# Patient Record
Sex: MALE | Race: WHITE
[De-identification: names, ages, dates, MRNs, and addresses within clinical notes are randomized per-mention and may not be internally consistent; named-entity substitution may affect disease eponyms.]

---

## 2018-07-20 ENCOUNTER — HOSPITAL ENCOUNTER (INPATIENT)
Dept: HOSPITAL 83 - 5E | Age: 33
LOS: 3 days | Discharge: HOME | DRG: 897 | End: 2018-07-23
Attending: INTERNAL MEDICINE | Admitting: INTERNAL MEDICINE
Payer: COMMERCIAL

## 2018-07-20 VITALS — DIASTOLIC BLOOD PRESSURE: 56 MMHG | SYSTOLIC BLOOD PRESSURE: 116 MMHG

## 2018-07-20 VITALS — SYSTOLIC BLOOD PRESSURE: 128 MMHG | DIASTOLIC BLOOD PRESSURE: 65 MMHG

## 2018-07-20 VITALS — DIASTOLIC BLOOD PRESSURE: 65 MMHG

## 2018-07-20 VITALS — WEIGHT: 181 LBS | BODY MASS INDEX: 25.34 KG/M2 | HEIGHT: 71 IN

## 2018-07-20 DIAGNOSIS — Z88.1: ICD-10-CM

## 2018-07-20 DIAGNOSIS — F11.23: Primary | ICD-10-CM

## 2018-07-20 DIAGNOSIS — R06.82: ICD-10-CM

## 2018-07-20 DIAGNOSIS — F19.10: ICD-10-CM

## 2018-07-20 DIAGNOSIS — E44.0: ICD-10-CM

## 2018-07-20 DIAGNOSIS — Z71.6: ICD-10-CM

## 2018-07-20 DIAGNOSIS — Z81.1: ICD-10-CM

## 2018-07-20 DIAGNOSIS — R94.5: ICD-10-CM

## 2018-07-20 DIAGNOSIS — F17.210: ICD-10-CM

## 2018-07-20 DIAGNOSIS — B19.20: ICD-10-CM

## 2018-07-20 DIAGNOSIS — R79.89: ICD-10-CM

## 2018-07-20 DIAGNOSIS — F14.10: ICD-10-CM

## 2018-07-20 LAB
ALBUMIN SERPL-MCNC: 3.7 GM/DL (ref 3.1–4.5)
ALP SERPL-CCNC: 59 U/L (ref 45–117)
ALT SERPL W P-5'-P-CCNC: 31 U/L (ref 12–78)
AMPHETAMINES UR QL SCN: < 1000
APPEARANCE UR: CLEAR
AST SERPL-CCNC: 16 IU/L (ref 3–35)
BARBITURATES UR QL SCN: < 200
BASOPHILS # BLD AUTO: 0 10*3/UL (ref 0–0.1)
BASOPHILS NFR BLD AUTO: 0.8 % (ref 0–1)
BENZODIAZ UR QL SCN: < 200
BILIRUB UR QL STRIP: NEGATIVE
BUN SERPL-MCNC: 6 MG/DL (ref 7–24)
BZE UR QL SCN: > 300
CANNABINOIDS UR QL SCN: > 50
CHLORIDE SERPL-SCNC: 106 MMOL/L (ref 98–107)
COLOR UR: YELLOW
CREAT SERPL-MCNC: 0.97 MG/DL (ref 0.7–1.3)
EOSINOPHIL # BLD AUTO: 0.2 10*3/UL (ref 0–0.4)
EOSINOPHIL # BLD AUTO: 4.6 % (ref 1–4)
EPI CELLS #/AREA URNS HPF: (no result) /[HPF]
ERYTHROCYTE [DISTWIDTH] IN BLOOD BY AUTOMATED COUNT: 13.2 % (ref 0–14.5)
GLUCOSE UR QL: NEGATIVE
HCT VFR BLD AUTO: 41 % (ref 42–52)
HGB BLD-MCNC: 13.6 G/DL (ref 14–18)
HGB UR QL STRIP: NEGATIVE
INR BLD: 1 (ref 2–3.5)
KETONES UR QL STRIP: NEGATIVE
LEUKOCYTE ESTERASE UR QL STRIP: NEGATIVE
LIPASE SERPL-CCNC: 65 U/L (ref 73–393)
LYMPHOCYTES # BLD AUTO: 1.2 10*3/UL (ref 1.3–4.4)
LYMPHOCYTES NFR BLD AUTO: 26.1 % (ref 27–41)
MCH RBC QN AUTO: 30.2 PG (ref 27–31)
MCHC RBC AUTO-ENTMCNC: 33.2 G/DL (ref 33–37)
MCV RBC AUTO: 90.9 FL (ref 80–94)
METHADONE UR QL SCN: < 300
MONOCYTES # BLD AUTO: 0.3 10*3/UL (ref 0.1–1)
MONOCYTES NFR BLD MANUAL: 6.7 % (ref 3–9)
NEUT #: 2.9 10*3/UL (ref 2.3–7.9)
NEUT %: 61.6 % (ref 47–73)
NITRITE UR QL STRIP: NEGATIVE
NRBC BLD QL AUTO: 0 10*3/UL (ref 0–0)
OPIATES UR QL SCN: > 300
PCP UR QL SCN: <  25
PH UR STRIP: 7 [PH] (ref 5–9)
PHOSPHATE SERPL-MCNC: 2.4 MG/DL (ref 2.5–4.9)
PLATELET # BLD AUTO: 211 10*3/UL (ref 130–400)
PMV BLD AUTO: 9.7 FL (ref 9.6–12.3)
POTASSIUM SERPL-SCNC: 4.2 MMOL/L (ref 3.5–5.1)
PROT SERPL-MCNC: 8 GM/DL (ref 6.4–8.2)
RBC # BLD AUTO: 4.51 10*6/UL (ref 4.5–5.9)
SODIUM SERPL-SCNC: 138 MMOL/L (ref 136–145)
SP GR UR: 1.01 (ref 1–1.03)
UROBILINOGEN UR STRIP-MCNC: 1 E.U./DL (ref 0.2–1)
VITAMIN B12: 383 PG/ML (ref 247–911)
WBC #/AREA URNS HPF: (no result) WBC/HPF (ref 0–5)
WBC NRBC COR # BLD AUTO: 4.8 10*3/UL (ref 4.8–10.8)

## 2018-07-21 VITALS — DIASTOLIC BLOOD PRESSURE: 71 MMHG

## 2018-07-21 VITALS — DIASTOLIC BLOOD PRESSURE: 64 MMHG

## 2018-07-21 VITALS — DIASTOLIC BLOOD PRESSURE: 64 MMHG | SYSTOLIC BLOOD PRESSURE: 120 MMHG

## 2018-07-21 VITALS — DIASTOLIC BLOOD PRESSURE: 60 MMHG

## 2018-07-21 VITALS — DIASTOLIC BLOOD PRESSURE: 77 MMHG

## 2018-07-21 VITALS — DIASTOLIC BLOOD PRESSURE: 65 MMHG

## 2018-07-22 VITALS — SYSTOLIC BLOOD PRESSURE: 108 MMHG | DIASTOLIC BLOOD PRESSURE: 52 MMHG

## 2018-07-22 VITALS — DIASTOLIC BLOOD PRESSURE: 49 MMHG

## 2018-07-22 VITALS — SYSTOLIC BLOOD PRESSURE: 123 MMHG | DIASTOLIC BLOOD PRESSURE: 72 MMHG

## 2018-07-22 VITALS — DIASTOLIC BLOOD PRESSURE: 70 MMHG

## 2018-07-23 VITALS — SYSTOLIC BLOOD PRESSURE: 112 MMHG | DIASTOLIC BLOOD PRESSURE: 58 MMHG

## 2018-07-23 LAB
BASOPHILS # BLD AUTO: 0 10*3/UL (ref 0–0.1)
BASOPHILS NFR BLD AUTO: 0.8 % (ref 0–1)
CREAT SERPL-MCNC: 0.96 MG/DL (ref 0.7–1.3)
EOSINOPHIL # BLD AUTO: 0.4 10*3/UL (ref 0–0.4)
EOSINOPHIL # BLD AUTO: 7.6 % (ref 1–4)
ERYTHROCYTE [DISTWIDTH] IN BLOOD BY AUTOMATED COUNT: 12.9 % (ref 0–14.5)
HCT VFR BLD AUTO: 44.3 % (ref 42–52)
HGB BLD-MCNC: 14.6 G/DL (ref 14–18)
LYMPHOCYTES # BLD AUTO: 1.4 10*3/UL (ref 1.3–4.4)
LYMPHOCYTES NFR BLD AUTO: 26.4 % (ref 27–41)
MCH RBC QN AUTO: 29.9 PG (ref 27–31)
MCHC RBC AUTO-ENTMCNC: 33 G/DL (ref 33–37)
MCV RBC AUTO: 90.8 FL (ref 80–94)
MONOCYTES # BLD AUTO: 0.5 10*3/UL (ref 0.1–1)
MONOCYTES NFR BLD MANUAL: 9.3 % (ref 3–9)
NEUT #: 2.9 10*3/UL (ref 2.3–7.9)
NEUT %: 55.7 % (ref 47–73)
NRBC BLD QL AUTO: 0 10*3/UL (ref 0–0)
PLATELET # BLD AUTO: 176 10*3/UL (ref 130–400)
PMV BLD AUTO: 10 FL (ref 9.6–12.3)
RBC # BLD AUTO: 4.88 10*6/UL (ref 4.5–5.9)
WBC NRBC COR # BLD AUTO: 5.3 10*3/UL (ref 4.8–10.8)

## 2019-01-25 ENCOUNTER — HOSPITAL ENCOUNTER (INPATIENT)
Dept: HOSPITAL 83 - 4E | Age: 34
LOS: 3 days | Discharge: LEFT BEFORE DISCHARGE | DRG: 894 | End: 2019-01-28
Attending: EMERGENCY MEDICINE | Admitting: EMERGENCY MEDICINE
Payer: COMMERCIAL

## 2019-01-25 VITALS — DIASTOLIC BLOOD PRESSURE: 70 MMHG

## 2019-01-25 VITALS — SYSTOLIC BLOOD PRESSURE: 104 MMHG | DIASTOLIC BLOOD PRESSURE: 50 MMHG

## 2019-01-25 VITALS — HEIGHT: 70.98 IN | BODY MASS INDEX: 23.27 KG/M2 | WEIGHT: 166.19 LBS

## 2019-01-25 DIAGNOSIS — Z71.6: ICD-10-CM

## 2019-01-25 DIAGNOSIS — F41.9: ICD-10-CM

## 2019-01-25 DIAGNOSIS — Z88.1: ICD-10-CM

## 2019-01-25 DIAGNOSIS — F14.10: ICD-10-CM

## 2019-01-25 DIAGNOSIS — F11.23: Primary | ICD-10-CM

## 2019-01-25 DIAGNOSIS — B19.20: ICD-10-CM

## 2019-01-25 DIAGNOSIS — Z81.1: ICD-10-CM

## 2019-01-25 DIAGNOSIS — F17.210: ICD-10-CM

## 2019-01-25 DIAGNOSIS — Z53.21: ICD-10-CM

## 2019-01-25 LAB
ALBUMIN SERPL-MCNC: 3.7 GM/DL (ref 3.1–4.5)
ALP SERPL-CCNC: 54 U/L (ref 45–117)
ALT SERPL W P-5'-P-CCNC: 17 U/L (ref 12–78)
AMPHETAMINES UR QL SCN: < 1000
AST SERPL-CCNC: 14 IU/L (ref 3–35)
BARBITURATES UR QL SCN: < 200
BENZODIAZ UR QL SCN: < 200
BUN SERPL-MCNC: 9 MG/DL (ref 7–24)
BZE UR QL SCN: > 300
CANNABINOIDS UR QL SCN: < 50
CHLORIDE SERPL-SCNC: 107 MMOL/L (ref 98–107)
CREAT SERPL-MCNC: 0.79 MG/DL (ref 0.7–1.3)
ERYTHROCYTE [DISTWIDTH] IN BLOOD BY AUTOMATED COUNT: 13.2 % (ref 0–14.5)
ETHANOL SERPL-MCNC: < 3 MG/DL (ref ?–3)
HCT VFR BLD AUTO: 42.7 % (ref 42–52)
HGB BLD-MCNC: 14.2 G/DL (ref 14–18)
INR BLD: 1 (ref 2–3.5)
MCH RBC QN AUTO: 29.3 PG (ref 27–31)
MCHC RBC AUTO-ENTMCNC: 33.3 G/DL (ref 33–37)
MCV RBC AUTO: 88.2 FL (ref 80–94)
METHADONE UR QL SCN: < 300
NRBC BLD QL AUTO: 0 10*3/UL (ref 0–0)
OPIATES UR QL SCN: > 300
PCP UR QL SCN: <  25
PLATELET # BLD AUTO: 154 10*3/UL (ref 130–400)
PLATELET SUFFICIENCY: NORMAL
PMV BLD AUTO: 9.8 FL (ref 9.6–12.3)
POTASSIUM SERPL-SCNC: 3.9 MMOL/L (ref 3.5–5.1)
PROT SERPL-MCNC: 7.8 GM/DL (ref 6.4–8.2)
RBC # BLD AUTO: 4.84 10*6/UL (ref 4.5–5.9)
RBC MORPH BLD: NORMAL
SODIUM SERPL-SCNC: 139 MMOL/L (ref 136–145)
TOTAL CELLS COUNTED: 100 #CELLS
WBC NRBC COR # BLD AUTO: 4.7 10*3/UL (ref 4.8–10.8)

## 2019-01-25 NOTE — EKG
[place], [place]
 
                               ELECTROCARDIOGRAM REPORT
 
        NAME: HINA ROME              ACCT #: [id number]  
        UNIT #: [id number]                        ROOM: 424       
        DOCTOR: PJ DRAFT REPORT          BIRTHDATE: [birth year]
 
 
 

 
 
                           [facility]
                                       
Test Date:    2019               Test Time:    17:45:02
Pat Name:     HINA ROME        Department:   
Patient ID:   [id number]             Room:         424 1
Gender:       M                        Technician:   18
:          1985               Requested By: COLUMBA OLEA
Order Number: [id number]      Reading MD:   Aston Trevino MD
                                 Measurements
Intervals                              Axis          
Rate:         61                       P:            30
MS:           152                      QRS:          26
QRSD:         78                       T:            6
QT:           413                                    
QTc:          416                                    
                           Interpretive Statements
Sinus rhythm
 
 
Electronically Signed On 2019 21:01:54 PST by Aston Trevino MD
 
CM:EKGRPT:ELECTROCARDIOGRAM REPORT
 
D: 19 1745
T: 19
    
COLUMBA PANG DRAFT REPORT         
COLUMBA OLEA DO

## 2019-01-25 NOTE — NUR
PATIENT MEETS [facility]. CINA=17. PATIENT IS GOING TO FOLLOW UP WITH
THE [facility]. THE FACILITY WILL PROVIDE TRANSPORTATION
UPON DISCHARGE. ALAINA BACH.

## 2019-01-25 NOTE — NUR
MSADMTime: N
A 33  year old MALE admitted to 
under services of CONNIE SAAB DO.
Pt. arrived via ambulatory from
[place].  Chief complaint: WITHDRAWAL.
 
JAREK BRAXTON

## 2019-01-26 VITALS — DIASTOLIC BLOOD PRESSURE: 79 MMHG | SYSTOLIC BLOOD PRESSURE: 139 MMHG

## 2019-01-26 VITALS — SYSTOLIC BLOOD PRESSURE: 101 MMHG | DIASTOLIC BLOOD PRESSURE: 53 MMHG

## 2019-01-26 VITALS — DIASTOLIC BLOOD PRESSURE: 67 MMHG

## 2019-01-26 VITALS — DIASTOLIC BLOOD PRESSURE: 63 MMHG

## 2019-01-26 NOTE — NUR
PATIENT IS SLEEPING AND AROUSES EASILY FOR ASSESSMENT. NO C/O OR S/S OF
DISTRESS NOTED AT THIS TIME. BED IS LOW, LOCKED, AND CALL LIGHT IS WITHIN
REACH. SEE SHIFT ASSESSMENT.

## 2019-01-26 NOTE — NUR
ROBAXIN GIVEN FOR C/O MUSCLE ACHES, REQIP GIVEN FOR C/O RESTLESS LEGS, ATIVAN
GIVEN FOR C/O ANXIETY. WILL MONITOR.

## 2019-01-27 VITALS — DIASTOLIC BLOOD PRESSURE: 62 MMHG

## 2019-01-27 VITALS — SYSTOLIC BLOOD PRESSURE: 102 MMHG | DIASTOLIC BLOOD PRESSURE: 63 MMHG

## 2019-01-27 VITALS — SYSTOLIC BLOOD PRESSURE: 112 MMHG | DIASTOLIC BLOOD PRESSURE: 61 MMHG

## 2019-01-27 VITALS — SYSTOLIC BLOOD PRESSURE: 101 MMHG | DIASTOLIC BLOOD PRESSURE: 53 MMHG

## 2019-01-27 VITALS — SYSTOLIC BLOOD PRESSURE: 98 MMHG | DIASTOLIC BLOOD PRESSURE: 54 MMHG

## 2019-01-27 NOTE — NUR
MEDICATED WITH PRN ATIVAN FOR ANXIOUSNESS, BENTYL FOR ABD CRAMPS, REQUIP FOR
RESTLESS LEGS, AND ROBAXIN FOR MUSCLE ACHES. WILL MONITOR

## 2019-01-28 VITALS — SYSTOLIC BLOOD PRESSURE: 106 MMHG | DIASTOLIC BLOOD PRESSURE: 64 MMHG

## 2019-01-28 VITALS — DIASTOLIC BLOOD PRESSURE: 64 MMHG

## 2019-01-28 NOTE — NUR
PATIENT RESTING IN BED WITH EYES CLOSED. RESPS EASY AND REGULAR. BED IN LOWEST
POSITION, CALL LIGHT IN REACH

## 2019-01-28 NOTE — NUR
Patient signed out AMA. Patient encouraged to stay and advised of
possible consequences of premature discharge.
Physician VINICIUS and supervisor SERAFIN GOMEZ notified.
Patient instructed what to do regarding care post-departure
from the hospital; emergency phone numbers provided.
Patent was accompanied by GIRLFRIEND.
 
NATASHA MARCELO

## 2019-01-28 NOTE — NUR
PATIENT WAS SCHEDULED TO GO TO [facility] FOR HIS AFTERCARE PLAN. PATIENT
STATES TO [facility] THAT HE HAS NO PLANS ON GOING TO THE [facility]. [facility]
STAFF OFFERED OTHER REFERRAL OPTIONS AND PATIENT REFUSED. [facility] STAFF ASKED
PATIENT IF HE HAS TRANSPORTATION AND HE STATED THAT HE DOES HAVE A RIDE.
ALAINA BACH.

## 2020-02-17 ENCOUNTER — HOSPITAL ENCOUNTER (INPATIENT)
Dept: HOSPITAL 83 - 5E | Age: 35
LOS: 4 days | Discharge: HOME | DRG: 773 | End: 2020-02-21
Attending: INTERNAL MEDICINE | Admitting: INTERNAL MEDICINE
Payer: COMMERCIAL

## 2020-02-17 VITALS — HEIGHT: 71 IN | BODY MASS INDEX: 24.86 KG/M2 | WEIGHT: 177.56 LBS

## 2020-02-17 VITALS — DIASTOLIC BLOOD PRESSURE: 65 MMHG | SYSTOLIC BLOOD PRESSURE: 121 MMHG

## 2020-02-17 DIAGNOSIS — F17.210: ICD-10-CM

## 2020-02-17 DIAGNOSIS — R00.1: ICD-10-CM

## 2020-02-17 DIAGNOSIS — F12.10: ICD-10-CM

## 2020-02-17 DIAGNOSIS — Z88.1: ICD-10-CM

## 2020-02-17 DIAGNOSIS — E87.8: ICD-10-CM

## 2020-02-17 DIAGNOSIS — F14.10: ICD-10-CM

## 2020-02-17 DIAGNOSIS — Z81.1: ICD-10-CM

## 2020-02-17 DIAGNOSIS — Z88.2: ICD-10-CM

## 2020-02-17 DIAGNOSIS — F11.23: Primary | ICD-10-CM

## 2020-02-17 LAB
ALBUMIN SERPL-MCNC: 4 GM/DL (ref 3.1–4.5)
ALP SERPL-CCNC: 62 U/L (ref 45–117)
ALT SERPL W P-5'-P-CCNC: 20 U/L (ref 12–78)
AMPHETAMINES UR QL SCN: < 1000
APPEARANCE UR: CLEAR
AST SERPL-CCNC: 15 IU/L (ref 3–35)
BACTERIA #/AREA URNS HPF: (no result) /[HPF]
BARBITURATES UR QL SCN: < 200
BASOPHILS # BLD AUTO: 0 10*3/UL (ref 0–0.1)
BASOPHILS NFR BLD AUTO: 0.6 % (ref 0–1)
BENZODIAZ UR QL SCN: < 200
BILIRUB UR QL STRIP: NEGATIVE
BUN SERPL-MCNC: 9 MG/DL (ref 7–24)
BZE UR QL SCN: > 300
CANNABINOIDS UR QL SCN: > 50
CHLORIDE SERPL-SCNC: 109 MMOL/L (ref 98–107)
COLOR UR: (no result)
CREAT SERPL-MCNC: 1.05 MG/DL (ref 0.7–1.3)
EOSINOPHIL # BLD AUTO: 0.2 10*3/UL (ref 0–0.4)
EOSINOPHIL # BLD AUTO: 4.4 % (ref 1–4)
EPI CELLS #/AREA URNS HPF: (no result) /[HPF]
ERYTHROCYTE [DISTWIDTH] IN BLOOD BY AUTOMATED COUNT: 12.9 % (ref 0–14.5)
ETHANOL SERPL-MCNC: < 3 MG/DL (ref ?–3)
GLUCOSE UR QL: NEGATIVE
HCT VFR BLD AUTO: 43.3 % (ref 42–52)
HGB BLD-MCNC: 14.2 G/DL (ref 14–18)
HGB UR QL STRIP: (no result)
INR BLD: 1 (ref 2–3.5)
KETONES UR QL STRIP: NEGATIVE
LEUKOCYTE ESTERASE UR QL STRIP: NEGATIVE
LYMPHOCYTES # BLD AUTO: 2.3 10*3/UL (ref 1.3–4.4)
LYMPHOCYTES NFR BLD AUTO: 46.9 % (ref 27–41)
MCH RBC QN AUTO: 29.6 PG (ref 27–31)
MCHC RBC AUTO-ENTMCNC: 32.8 G/DL (ref 33–37)
MCV RBC AUTO: 90.2 FL (ref 80–94)
METHADONE UR QL SCN: < 300
MONOCYTES # BLD AUTO: 0.2 10*3/UL (ref 0.1–1)
MONOCYTES NFR BLD MANUAL: 5 % (ref 3–9)
NEUT #: 2.1 10*3/UL (ref 2.3–7.9)
NEUT %: 42.9 % (ref 47–73)
NITRITE UR QL STRIP: NEGATIVE
NRBC BLD QL AUTO: 0 % (ref 0–0)
OPIATES UR QL SCN: > 300
PCP UR QL SCN: <  25
PH UR STRIP: 7.5 [PH] (ref 5–9)
PLATELET # BLD AUTO: 210 10*3/UL (ref 130–400)
PMV BLD AUTO: 9.8 FL (ref 9.6–12.3)
POTASSIUM SERPL-SCNC: 3.8 MMOL/L (ref 3.5–5.1)
PROT SERPL-MCNC: 7.7 GM/DL (ref 6.4–8.2)
RBC # BLD AUTO: 4.8 10*6/UL (ref 4.5–5.9)
RBC #/AREA URNS HPF: (no result) RBC/HPF (ref 0–2)
SODIUM SERPL-SCNC: 140 MMOL/L (ref 136–145)
SP GR UR: 1.01 (ref 1–1.03)
UROBILINOGEN UR STRIP-MCNC: 0.2 E.U./DL (ref 0.2–1)
WBC #/AREA URNS HPF: (no result) WBC/HPF (ref 0–5)
WBC NRBC COR # BLD AUTO: 4.8 10*3/UL (ref 4.8–10.8)

## 2020-02-17 NOTE — NUR
PATIENT MEETS [facility]. CINA=18. PATIENT WANTS TO FOLLOW UP WITH
[facility] FOR HIS AFTERCARE PLAN. ALAINA BACH. INTAKE
COORDINATOR

## 2020-02-17 NOTE — NUR
34 year old MALE
admitted to room # 518 for stabilization. Reports an addiction to
HEROIN, SUBOXONE AND COCAINE
last used 24 hours prior to admission. Compliant with admission procedure.
Patient denies any anxiety, but is unable to sit still, taps toes to floor
continuously, looks about room, unable to focus eyes on nurse during interview.
See assessment forms for additional information about patient status.

## 2020-02-18 VITALS — SYSTOLIC BLOOD PRESSURE: 114 MMHG | DIASTOLIC BLOOD PRESSURE: 60 MMHG

## 2020-02-18 VITALS — DIASTOLIC BLOOD PRESSURE: 61 MMHG | SYSTOLIC BLOOD PRESSURE: 113 MMHG

## 2020-02-18 VITALS — DIASTOLIC BLOOD PRESSURE: 64 MMHG

## 2020-02-18 NOTE — NUR
[facility] STAFF IN TO SEE PATIENT. PATIENT IS STILL WANTING TO GO TO [facility]
FOR HIS AFTERCARE PLAN. [facility] ST[facility]FF SENT ASSESSMENT TO FACILITY. [facility]
WILL PROVIDE TRANSPORTATION ONCE PATIENT IS DISCHARGED. ALAINA BACH.

## 2020-02-19 VITALS — DIASTOLIC BLOOD PRESSURE: 54 MMHG

## 2020-02-19 VITALS — SYSTOLIC BLOOD PRESSURE: 110 MMHG | DIASTOLIC BLOOD PRESSURE: 52 MMHG

## 2020-02-19 VITALS — DIASTOLIC BLOOD PRESSURE: 63 MMHG

## 2020-02-19 VITALS — DIASTOLIC BLOOD PRESSURE: 70 MMHG | SYSTOLIC BLOOD PRESSURE: 119 MMHG

## 2020-02-19 NOTE — NUR
[facility] STAFF IN TO SEE PATIENT. PATIENT IS ACCEPTED TO [facility]. THEY WILL
PROVIDE TRANSPORTATION ONCE PATIENT IS DISCHARGED FROM [facility]. PATIENT
AGREES AND UNDERSTANDS HIS AFTERCARE PLAN. ALAINA BACH. INTAKE
COORDINATOR

## 2020-02-20 VITALS — DIASTOLIC BLOOD PRESSURE: 65 MMHG | SYSTOLIC BLOOD PRESSURE: 104 MMHG

## 2020-02-20 VITALS — DIASTOLIC BLOOD PRESSURE: 78 MMHG

## 2020-02-20 VITALS — DIASTOLIC BLOOD PRESSURE: 66 MMHG

## 2020-02-20 VITALS — DIASTOLIC BLOOD PRESSURE: 64 MMHG

## 2020-02-20 NOTE — NUR
Patient displaying withdrawal symptoms, including: anxiousness AND
restlessness. PRN medications provided, SEE EMAR. Will continue to monitor
medication effectiveness.

## 2020-02-21 VITALS — SYSTOLIC BLOOD PRESSURE: 111 MMHG | DIASTOLIC BLOOD PRESSURE: 66 MMHG

## 2020-02-21 VITALS — DIASTOLIC BLOOD PRESSURE: 68 MMHG

## 2020-02-21 NOTE — NUR
Discharge instructions reviewed with patient/family. Patient receptive and
verbalizes understanding. Follow-up care arranged. Written instructions given
to patient/family.
HISSOM,PITER
The Discharge Plan/Instructions have been completed.

## 2020-05-22 ENCOUNTER — HOSPITAL ENCOUNTER (INPATIENT)
Dept: HOSPITAL 83 - 4E | Age: 35
LOS: 4 days | Discharge: HOME | DRG: 773 | End: 2020-05-26
Attending: INTERNAL MEDICINE | Admitting: INTERNAL MEDICINE
Payer: COMMERCIAL

## 2020-05-22 VITALS — BODY MASS INDEX: 36.14 KG/M2 | WEIGHT: 184.06 LBS | HEIGHT: 60 IN

## 2020-05-22 VITALS — SYSTOLIC BLOOD PRESSURE: 114 MMHG | DIASTOLIC BLOOD PRESSURE: 60 MMHG

## 2020-05-22 VITALS — DIASTOLIC BLOOD PRESSURE: 55 MMHG

## 2020-05-22 VITALS — SYSTOLIC BLOOD PRESSURE: 135 MMHG | DIASTOLIC BLOOD PRESSURE: 84 MMHG

## 2020-05-22 DIAGNOSIS — Z71.6: ICD-10-CM

## 2020-05-22 DIAGNOSIS — R73.9: ICD-10-CM

## 2020-05-22 DIAGNOSIS — Z79.899: ICD-10-CM

## 2020-05-22 DIAGNOSIS — Z88.1: ICD-10-CM

## 2020-05-22 DIAGNOSIS — R00.1: ICD-10-CM

## 2020-05-22 DIAGNOSIS — F17.210: ICD-10-CM

## 2020-05-22 DIAGNOSIS — F12.10: ICD-10-CM

## 2020-05-22 DIAGNOSIS — Z81.1: ICD-10-CM

## 2020-05-22 DIAGNOSIS — F11.23: Primary | ICD-10-CM

## 2020-05-22 DIAGNOSIS — F14.10: ICD-10-CM

## 2020-05-22 DIAGNOSIS — Z88.8: ICD-10-CM

## 2020-05-22 DIAGNOSIS — M25.471: ICD-10-CM

## 2020-05-22 DIAGNOSIS — D64.9: ICD-10-CM

## 2020-05-22 DIAGNOSIS — B19.20: ICD-10-CM

## 2020-05-22 DIAGNOSIS — M79.89: ICD-10-CM

## 2020-05-22 DIAGNOSIS — E87.8: ICD-10-CM

## 2020-05-22 DIAGNOSIS — F41.9: ICD-10-CM

## 2020-05-22 LAB
ALBUMIN SERPL-MCNC: 3.4 GM/DL (ref 3.1–4.5)
ALP SERPL-CCNC: 72 U/L (ref 45–117)
ALT SERPL W P-5'-P-CCNC: 69 U/L (ref 12–78)
AMPHETAMINES UR QL SCN: < 1000
APPEARANCE UR: CLEAR
AST SERPL-CCNC: 28 IU/L (ref 3–35)
BACTERIA #/AREA URNS HPF: (no result) /[HPF]
BARBITURATES UR QL SCN: < 200
BASOPHILS # BLD AUTO: 0 10*3/UL (ref 0–0.1)
BASOPHILS NFR BLD AUTO: 0.2 % (ref 0–1)
BENZODIAZ UR QL SCN: < 200
BILIRUB UR QL STRIP: (no result)
BUN SERPL-MCNC: 8 MG/DL (ref 7–24)
BZE UR QL SCN: > 300
CANNABINOIDS UR QL SCN: > 50
CHLORIDE SERPL-SCNC: 105 MMOL/L (ref 98–107)
COLOR UR: YELLOW
CREAT SERPL-MCNC: 0.73 MG/DL (ref 0.7–1.3)
EOSINOPHIL # BLD AUTO: 0 10*3/UL (ref 0–0.4)
EOSINOPHIL # BLD AUTO: 0.2 % (ref 1–4)
EPI CELLS #/AREA URNS HPF: (no result) /[HPF]
ERYTHROCYTE [DISTWIDTH] IN BLOOD BY AUTOMATED COUNT: 13.3 % (ref 0–14.5)
ETHANOL SERPL-MCNC: < 3 MG/DL (ref ?–3)
GLUCOSE UR QL: NEGATIVE
HCT VFR BLD AUTO: 35.9 % (ref 42–52)
HGB UR QL STRIP: NEGATIVE
INR BLD: 1 (ref 2–3.5)
KETONES UR QL STRIP: (no result)
LEUKOCYTE ESTERASE UR QL STRIP: NEGATIVE
LYMPHOCYTES # BLD AUTO: 0.7 10*3/UL (ref 1.3–4.4)
LYMPHOCYTES NFR BLD AUTO: 9.2 % (ref 27–41)
MCH RBC QN AUTO: 29.6 PG (ref 27–31)
MCHC RBC AUTO-ENTMCNC: 33.1 G/DL (ref 33–37)
MCV RBC AUTO: 89.3 FL (ref 80–94)
METHADONE UR QL SCN: > 300
MONOCYTES # BLD AUTO: 0.9 10*3/UL (ref 0.1–1)
MONOCYTES NFR BLD MANUAL: 11.3 % (ref 3–9)
MUCOUS THREADS URNS QL MICRO: (no result)
NEUT #: 6.3 10*3/UL (ref 2.3–7.9)
NEUT %: 78.6 % (ref 47–73)
NITRITE UR QL STRIP: NEGATIVE
NRBC BLD QL AUTO: 0 % (ref 0–0)
OPIATES UR QL SCN: > 300
PCP UR QL SCN: <  25
PH UR STRIP: 6 [PH] (ref 5–9)
PLATELET # BLD AUTO: 197 10*3/UL (ref 130–400)
PMV BLD AUTO: 9.8 FL (ref 9.6–12.3)
POTASSIUM SERPL-SCNC: 3.5 MMOL/L (ref 3.5–5.1)
PROT SERPL-MCNC: 7.3 GM/DL (ref 6.4–8.2)
RBC # BLD AUTO: 4.02 10*6/UL (ref 4.5–5.9)
SODIUM SERPL-SCNC: 137 MMOL/L (ref 136–145)
SP GR UR: 1.02 (ref 1–1.03)
UROBILINOGEN UR STRIP-MCNC: 2 E.U./DL (ref 0.2–1)
WBC #/AREA URNS HPF: (no result) WBC/HPF (ref 0–5)
WBC NRBC COR # BLD AUTO: 8 10*3/UL (ref 4.8–10.8)

## 2020-05-22 NOTE — NUR
CT CALLS AT THIS TIME AND ASKS IF PATIENT IS ABLE TO DRINK ORAL CONTRAST OR IF
PHYSICIAN WOULD LIKE TO RESCHEDULE CT. DR WASHINGTON NOTIFIED AND SAYS THAT IT
IS OKAY TO RESCHEDULE CT FOR TOMORROW. CT NOTIFIED OF THIS. CURRENT CT ORDER
CANCELED AND NEW ORDER PLACED.

## 2020-05-22 NOTE — NUR
PATIENT MEETS [facility]. CINA=21. JEREMY IS WANTING TO FOLLOW UP
WITH [facility] FOR HIS ATERCARE PLAN. PATIENT REPORT THAT HE HAS
TRANSPORTATION. IF HE NEEDS HIS INSURANCE TO  UP, THE PHONE NUMBER IS
[phone]. [employer]. ALAINA BACH.

## 2020-05-22 NOTE — NUR
BENTYL, ROBAXIN, AND ZOFRAN NOT EFFECTIVE PER PT AT THIS TIME. PHYSICIANS
NOTIFIED AND NEW ORDERS RECEIVED.

## 2020-05-22 NOTE — NUR
DR WASHINGTON STATES TO NOT GIVE BENADRYL AND PHENERGAN TOGETHER, PHYSICIAN
WOULD LIKE PT TO HAVE PHENERGAN.

## 2020-05-22 NOTE — NUR
PT GIVEN PHENERGAN VIA IV PUSH AT THIS TIME TO IV SITE IN LEFT FOREARM. PT
TOLERATED WELL. WILL MONITOR FOR EFFECTIVENESS. PT SITTING UP IN BED, EMESIS
BASIN AT BEDSIDE. SAFETY MEASURES IN PLACE. CALL LIGHT IN REACH.

## 2020-05-22 NOTE — NUR
PT GIVEN PO ZOFRAN, ROBAXIN, AND BENTYL AT THIS TIME FOR C/O NAUSEA, MUSCLE
ACHES, AND STOMACH CRAMPS. PT STATES THAT THEY HAVE HAD MULTIPLE EPISODES OF
EMESIS AND CONTINUES TO YELL OUT DUE TO ABDOMINAL PAIN/NAUSEA. WILL MONITOR
FOR EFFECTIVENESS OF MEDS. CALL LIGHT IN REACH.

## 2020-05-22 NOTE — NUR
PT GIVEN TYLENOL 500 MG PO FOR C/O HEADACHE/BODY ACHES. WILL MONITOR FOR
EFFECTIVENESS. CALL LIGHT IN REACH.

## 2020-05-22 NOTE — NUR
A 34, admitted to , under the
services of TRACY Quiñones DO with a diagnosis of OPIATE WITHDRAWAL.
Chief complaint is OPIATE WITHDRAWAL S/S.
Patient arrived via ambulatory from [facility]
 Initial assessment completed.
Vital signs taken and recorded.
TRACY QUIÑONES DO notified of admission to the unit.
Orders received.
See assessment for past medical history, medications
and allergies.
Patient and/or family oriented to unit. [facility]
visitation policy reviewed.
Clothing/patient valuable form completed.
 
YOVANA VALDEZ

## 2020-05-22 NOTE — NUR
DR WASHINGTON NOTIFIED THAT PT IS YELLING OUT IN PAIN AND C/O NAUSEA/VOMITING.
NEW ORDERS RECEIVED FOR EKG, IV FLUIDS AT 100CC/HOUR, BLOOD CULTURES, LABS:
CRP,ESR,LACTIC ACID, CT OF ABDOMEN AND PELVIS WITHOUT CONTRAST, AND TO
ADMINISTER PHENERGAN AND BENADRYL. WILL PLACE APPROPRIATE ORDERS.

## 2020-05-22 NOTE — NUR
IV started left forearm with #22 protective cath after 3  attempts.
Site prepped with Chloroprep.
Sterile dressing applied. Patient tolerated procedure well.
IV site locked with saline at this time.
 
YOVANA VALDEZ

## 2020-05-23 VITALS — DIASTOLIC BLOOD PRESSURE: 59 MMHG | SYSTOLIC BLOOD PRESSURE: 129 MMHG

## 2020-05-23 VITALS — DIASTOLIC BLOOD PRESSURE: 62 MMHG

## 2020-05-23 VITALS — DIASTOLIC BLOOD PRESSURE: 67 MMHG

## 2020-05-23 LAB
ALBUMIN SERPL-MCNC: 3 GM/DL (ref 3.1–4.5)
ALP SERPL-CCNC: 67 U/L (ref 45–117)
ALT SERPL W P-5'-P-CCNC: 57 U/L (ref 12–78)
AST SERPL-CCNC: 20 IU/L (ref 3–35)
BASOPHILS # BLD AUTO: 0 10*3/UL (ref 0–0.1)
BASOPHILS NFR BLD AUTO: 0.1 % (ref 0–1)
BUN SERPL-MCNC: 7 MG/DL (ref 7–24)
CHLORIDE SERPL-SCNC: 109 MMOL/L (ref 98–107)
CREAT SERPL-MCNC: 0.69 MG/DL (ref 0.7–1.3)
EOSINOPHIL # BLD AUTO: 0 % (ref 1–4)
EOSINOPHIL # BLD AUTO: 0 10*3/UL (ref 0–0.4)
ERYTHROCYTE [DISTWIDTH] IN BLOOD BY AUTOMATED COUNT: 13.2 % (ref 0–14.5)
HCT VFR BLD AUTO: 37.3 % (ref 42–52)
INR BLD: 1.1 (ref 2–3.5)
LYMPHOCYTES # BLD AUTO: 1.2 10*3/UL (ref 1.3–4.4)
LYMPHOCYTES NFR BLD AUTO: 13.1 % (ref 27–41)
MCH RBC QN AUTO: 29.8 PG (ref 27–31)
MCHC RBC AUTO-ENTMCNC: 33.5 G/DL (ref 33–37)
MCV RBC AUTO: 89 FL (ref 80–94)
MONOCYTES # BLD AUTO: 0.4 10*3/UL (ref 0.1–1)
MONOCYTES NFR BLD MANUAL: 4.3 % (ref 3–9)
NEUT #: 7.6 10*3/UL (ref 2.3–7.9)
NEUT %: 82.3 % (ref 47–73)
NRBC BLD QL AUTO: 0 % (ref 0–0)
PLATELET # BLD AUTO: 211 10*3/UL (ref 130–400)
PMV BLD AUTO: 10.1 FL (ref 9.6–12.3)
POTASSIUM SERPL-SCNC: 3.3 MMOL/L (ref 3.5–5.1)
PROT SERPL-MCNC: 7 GM/DL (ref 6.4–8.2)
RBC # BLD AUTO: 4.19 10*6/UL (ref 4.5–5.9)
SODIUM SERPL-SCNC: 141 MMOL/L (ref 136–145)
WBC NRBC COR # BLD AUTO: 9.2 10*3/UL (ref 4.8–10.8)

## 2020-05-23 NOTE — NUR
RESTING IN BED; AROUSES EASILY.  IV FLUIDS INFUSING AS ORDERED; SITE
ASYMPTOMATIC.  PT. VOICES NO C/O AT THIS TIME.  CALL LIGHT WITHIN REACH.

## 2020-05-23 NOTE — NUR
PT REFUSING DO DRINK CT PREP, STATING HE IS SICK AS F***. PT C/O NUASEAALIYA.
PAIN AND LT LOWER QUAD TENDERNESS. WILL CONTINUE TO MONITOR.

## 2020-05-23 NOTE — NUR
PT STATES HE DOES NOT WANT TO HAVE CT SCAN EVEN IF HE DOES NOT HAVE TO DRINK
PREP. DR. WATTERS NOTIFIED. PT STATES HE JUST WANT SUBUTEX. DR. WATTERS SAYS AS
LONG AS HE CAN TOLERATE IT, IT IS FINE. WILL CONTINUE TO MONITOR.

## 2020-05-24 VITALS — DIASTOLIC BLOOD PRESSURE: 71 MMHG

## 2020-05-24 VITALS — DIASTOLIC BLOOD PRESSURE: 76 MMHG

## 2020-05-24 VITALS — SYSTOLIC BLOOD PRESSURE: 147 MMHG | DIASTOLIC BLOOD PRESSURE: 77 MMHG

## 2020-05-24 VITALS — DIASTOLIC BLOOD PRESSURE: 78 MMHG

## 2020-05-24 VITALS — DIASTOLIC BLOOD PRESSURE: 60 MMHG

## 2020-05-24 NOTE — NUR
PATIENT'S GIRLFRIEND CALLED IN ASKING WHY PATIENT HAS NOT ANSWERED HER PHONE
CALLS. THIS RN WENT TO CHECK ON PATIENT AND PATIENT IS SLEEPING WITH REGULAR
AND EASY RESPIRATIONS. GIRLFRIEND WAS TOLD THAT THE PATIENT IS SLEEPING.
GIRLFRIEND NOT HAPPY WITH THIS RESPONSE AND HUNG UP ON THIS RN.

## 2020-05-24 NOTE — NUR
PT COMPLAINS OF STOMACH UPSET, NAUSEA, ANXIETY, RESTLESSNESS AND ALL OVER
PAIN. PRN BENTYL, REQUIP, TYLENOL, VISTARIL AND ZOFRAN ADMINISTERED. WILL
MONITOR FOR EFFECTIVENESS.

## 2020-05-24 NOTE — NUR
EARLIER TYLENOL EFFECTIVE. TEMP IS NOW 98.5.
 
PT REQUESTED AND RECEIVED NICOTINE GUM FOR NICOTINE CRAVINGS. PO VISTARIL ALSO
ADMINISTERED PER PRN ORDER FOR C/O ANXIETY. SCHEDULED SUBUTEX ALSO GIVEN AT
THIS TIME. WILL MONITOR EFFECTIVENESS.
 
SNACKS/DRINKS ALSO PROVIDED PER REQUEST. PT STATES HE HAS NOT BEEN ABLE TO EAT
FOR PAST 2-3 DAYS & WOULD LIKE TO TRY TO EAT NOW. WILL MONITOR.
 
CALL LIGHT IN REACH,

## 2020-05-25 VITALS — DIASTOLIC BLOOD PRESSURE: 86 MMHG

## 2020-05-25 VITALS — DIASTOLIC BLOOD PRESSURE: 69 MMHG

## 2020-05-25 VITALS — DIASTOLIC BLOOD PRESSURE: 81 MMHG | SYSTOLIC BLOOD PRESSURE: 145 MMHG

## 2020-05-25 VITALS — SYSTOLIC BLOOD PRESSURE: 166 MMHG | DIASTOLIC BLOOD PRESSURE: 88 MMHG

## 2020-05-25 VITALS — DIASTOLIC BLOOD PRESSURE: 80 MMHG | SYSTOLIC BLOOD PRESSURE: 153 MMHG

## 2020-05-25 NOTE — NUR
C/O BACK PAIN AND "FEELING A LITTLE ANXIOUS" ROBAXIN AND VISTARIL GIVEN PER
ORDER FOR WITHDRAWL SYMPTOMS.

## 2020-05-26 VITALS — DIASTOLIC BLOOD PRESSURE: 75 MMHG | SYSTOLIC BLOOD PRESSURE: 127 MMHG

## 2020-05-26 VITALS — DIASTOLIC BLOOD PRESSURE: 64 MMHG | SYSTOLIC BLOOD PRESSURE: 134 MMHG

## 2020-05-26 NOTE — NUR
See assessment. Pt states he is feeling well today and is hoping to be
discharged. States he has plans to go to [facility] but needs
transportation.

## 2020-05-26 NOTE — NUR
Discharge instructions reviewed with patient. Patient receptive and verbalizes
understanding. Follow-up care arranged. Written instructions given to patient.
Pt states he will walk out on own and meet ride when they arrived out of ER.
Notified [facility] of this.

## 2020-05-26 NOTE — NUR
Ruben up from [facility] and notified that she had arranged transportation for
him. States ride will be here today anywhere from 30 minutes from now to 3
hours.